# Patient Record
Sex: FEMALE | Race: BLACK OR AFRICAN AMERICAN | NOT HISPANIC OR LATINO
[De-identification: names, ages, dates, MRNs, and addresses within clinical notes are randomized per-mention and may not be internally consistent; named-entity substitution may affect disease eponyms.]

---

## 2022-09-23 PROBLEM — Z00.00 ENCOUNTER FOR PREVENTIVE HEALTH EXAMINATION: Status: ACTIVE | Noted: 2022-09-23

## 2022-11-02 ENCOUNTER — APPOINTMENT (OUTPATIENT)
Dept: ORTHOPEDIC SURGERY | Facility: CLINIC | Age: 37
End: 2022-11-02
Payer: COMMERCIAL

## 2022-11-02 ENCOUNTER — NON-APPOINTMENT (OUTPATIENT)
Age: 37
End: 2022-11-02

## 2022-11-02 VITALS
OXYGEN SATURATION: 98 % | DIASTOLIC BLOOD PRESSURE: 70 MMHG | HEART RATE: 71 BPM | BODY MASS INDEX: 32.49 KG/M2 | WEIGHT: 195 LBS | SYSTOLIC BLOOD PRESSURE: 129 MMHG | HEIGHT: 65 IN

## 2022-11-02 DIAGNOSIS — M25.579 PAIN IN UNSPECIFIED ANKLE AND JOINTS OF UNSPECIFIED FOOT: ICD-10-CM

## 2022-11-02 PROCEDURE — 20550 NJX 1 TENDON SHEATH/LIGAMENT: CPT

## 2022-11-02 PROCEDURE — 99204 OFFICE O/P NEW MOD 45 MIN: CPT | Mod: 25

## 2022-11-02 PROCEDURE — 73610 X-RAY EXAM OF ANKLE: CPT | Mod: RT

## 2022-11-06 PROBLEM — M25.579 ANKLE PAIN: Status: ACTIVE | Noted: 2022-11-06

## 2024-05-20 ENCOUNTER — EMERGENCY (EMERGENCY)
Facility: HOSPITAL | Age: 39
LOS: 1 days | Discharge: HOME | End: 2024-05-20
Admitting: EMERGENCY MEDICINE
Payer: COMMERCIAL

## 2024-05-20 VITALS
OXYGEN SATURATION: 98 % | TEMPERATURE: 99 F | WEIGHT: 214.95 LBS | HEART RATE: 98 BPM | RESPIRATION RATE: 18 BRPM | DIASTOLIC BLOOD PRESSURE: 86 MMHG | SYSTOLIC BLOOD PRESSURE: 126 MMHG

## 2024-05-20 PROCEDURE — 99284 EMERGENCY DEPT VISIT MOD MDM: CPT

## 2024-05-20 PROCEDURE — 70360 X-RAY EXAM OF NECK: CPT | Mod: 26

## 2024-05-20 RX ORDER — DEXAMETHASONE 0.5 MG/5ML
6 ELIXIR ORAL ONCE
Refills: 0 | Status: COMPLETED | OUTPATIENT
Start: 2024-05-20 | End: 2024-05-20

## 2024-05-20 RX ADMIN — Medication 6 MILLIGRAM(S): at 21:44

## 2024-05-20 NOTE — ED PROVIDER NOTE - PATIENT PORTAL LINK FT
You can access the FollowMyHealth Patient Portal offered by [facility] by registering at the following website: [email or web address]. By joining [employer]’s FollowMyHealth portal, you will also be able to view your health information using other applications (apps) compatible with our system.

## 2024-05-20 NOTE — ED PROVIDER NOTE - CLINICAL SUMMARY MEDICAL DECISION MAKING FREE TEXT BOX
39-year-old female, denies past medical history, presents to the emergency room complaining of foreign body sensation in her throat after a choking episode while running on the treadmill today. No red flags on exam with equal breath sounds throughout and no stridor.  Patient also is not drooling, oropharynx is clear posteriorly and uvula is midline.  She speaks in full clear sentences and is not noted to be in any acute respiratory distress.  Soft tissue neck x-ray obtained and there is no acute evidence of obstruction or narrowing along the airway.  Patient given p.o. dose of Decadron for symptomatic relief.  Will plan to discharge in setting of negative workup.  She is given strict return precautions for any worsening symptoms and leaves in no acute distress.

## 2024-05-20 NOTE — ED PROVIDER NOTE - OBJECTIVE STATEMENT
39-year-old female, denies past medical history, presents to the emergency room complaining of foreign body sensation in her throat after a choking episode while running on the treadmill today.  Patient states she had a granola bar this morning followed by chicken, rice, and beans for lunch.  She reports she had no issues with swallowing and the discomfort in her throat has only been intermittent.  She will occasionally notice it when she inhales air through her mouth.  But other times she does not feel it.  Patient denies drooling, shortness of breath, chest pain, headache, dizziness, nausea or vomiting.  She further denies any recent ingestion of food with volumes.

## 2024-05-20 NOTE — ED PROVIDER NOTE - PHYSICAL EXAMINATION
VITAL SIGNS: I have reviewed nursing notes and confirm.  CONSTITUTIONAL: Well-developed; well-nourished; in no acute distress.  SKIN: Skin is warm and dry, no acute rash.  HEAD: Normocephalic; atraumatic.  NECK: Supple; non tender.  ENT: +Clear oropharynx without evidence of obstruction.  Uvula midline without edema.  CARD: S1, S2 normal; no murmurs, gallops, or rubs. Regular rate and rhythm. No stridor on exam with equal breath sounds bilaterally.  RESP: No wheezes, rales or rhonchi.  ABD: Soft; non-distended; non-tender; no rebound or guarding.  EXT: Normal ROM. No clubbing, cyanosis or edema.  NEURO: Alert, oriented. Grossly unremarkable. MYERS, normal tone, no gross motor or sensory changes. Fluent speech.   PSYCH: Cooperative, appropriate. Mood and affect wnl.

## 2024-05-20 NOTE — ED PROVIDER NOTE - NS ED ROS FT
+globus sensation  Denies fevers, chills, nausea, vomiting, diarrhea, constipation, abdominal pain, urinary symptoms, chest pain, palpitations, shortness of breath, dyspnea on exertion, syncope/near syncope, headache, weakness, numbness, focal deficits, visual changes, gait or balance changes, dizziness

## 2024-05-20 NOTE — ED ADULT NURSE NOTE - OBJECTIVE STATEMENT
pt reports left-sided throat pain, thinks something (granola) is stuck; denies chest pain, SOB, nausea; pt alert & oriented x4, in no acute distress; airway patent, pt able to swallow PO med ordered in ED without incident

## 2024-05-20 NOTE — ED ADULT TRIAGE NOTE - CHIEF COMPLAINT QUOTE
Pt presents to ed reporting foreign body sensation to throat, thinks a piece of granola possibly got stuck. no airway involvement, pt speaking in full sentences. primarily on left side

## 2024-05-23 DIAGNOSIS — F45.8 OTHER SOMATOFORM DISORDERS: ICD-10-CM
